# Patient Record
Sex: MALE | Race: BLACK OR AFRICAN AMERICAN | NOT HISPANIC OR LATINO | Employment: FULL TIME | URBAN - METROPOLITAN AREA
[De-identification: names, ages, dates, MRNs, and addresses within clinical notes are randomized per-mention and may not be internally consistent; named-entity substitution may affect disease eponyms.]

---

## 2020-09-26 ENCOUNTER — HOSPITAL ENCOUNTER (EMERGENCY)
Facility: HOSPITAL | Age: 30
Discharge: HOME | End: 2020-09-26
Attending: FAMILY MEDICINE
Payer: COMMERCIAL

## 2020-09-26 VITALS
WEIGHT: 155 LBS | RESPIRATION RATE: 20 BRPM | TEMPERATURE: 98 F | BODY MASS INDEX: 28.52 KG/M2 | HEIGHT: 62 IN | DIASTOLIC BLOOD PRESSURE: 102 MMHG | SYSTOLIC BLOOD PRESSURE: 151 MMHG | OXYGEN SATURATION: 99 % | HEART RATE: 91 BPM

## 2020-09-26 DIAGNOSIS — Z20.822 EXPOSURE TO COVID-19 VIRUS: Primary | ICD-10-CM

## 2020-09-26 PROCEDURE — 99282 EMERGENCY DEPT VISIT SF MDM: CPT

## 2020-09-26 PROCEDURE — U0003 INFECTIOUS AGENT DETECTION BY NUCLEIC ACID (DNA OR RNA); SEVERE ACUTE RESPIRATORY SYNDROME CORONAVIRUS 2 (SARS-COV-2) (CORONAVIRUS DISEASE [COVID-19]), AMPLIFIED PROBE TECHNIQUE, MAKING USE OF HIGH THROUGHPUT TECHNOLOGIES AS DESCRIBED BY CMS-2020-01-R: HCPCS

## 2020-09-26 NOTE — Clinical Note
"Harvey"Lexie Hearn was seen and treated in our emergency department on 9/26/2020.     COVID-19 is present in our communities across the state. There is limited testing for COVID at this time, so not all patients can be tested. In this situation, your employee meets the following criteria:    Harvey Hearn has met the criteria for COVID-19 testing based upon symptoms, travel, and/or potential exposure. The test has been completed and is pending results at this time. During this time the employee is not able to work and should be quarantined per the Centers for Disease Control timelines.     If you have any questions or concerns, or if I can be of further assistance, please do not hesitate to contact me.    Sincerely,             Ilan Feng Jr., HEMAL"

## 2020-09-26 NOTE — ED PROVIDER NOTES
HISTORY     Chief Complaint   Patient presents with    COVID-19 Concerns     pt denies sx. states g/f was seen here last night w/ + covid results.          Review of patient's allergies indicates:   Allergen Reactions    Penicillins Hives and Rash        HPI   The history is provided by the patient.        Pt reports being exposed to Covid-19. Pt reports the following symptoms: none. Pt denies any of the following: CP, SOB, fever, NVD, abdominal pain or any other symptoms at this time.     PCP: Len Nicole MD     Past Medical History:  No past medical history on file.     Past Surgical History:  No past surgical history on file.     Family History:  No family history on file.     Social History:  Social History     Tobacco Use    Smoking status: Not on file   Substance and Sexual Activity    Alcohol use: Not on file    Drug use: Not on file    Sexual activity: Not on file         ROS   Review of Systems   Constitutional: Negative for fever.   HENT: Negative for sore throat.    Respiratory: Negative for shortness of breath.    Cardiovascular: Negative for chest pain.   Gastrointestinal: Negative for nausea.   Genitourinary: Negative for dysuria.   Musculoskeletal: Negative for back pain.   Skin: Negative for rash.   Neurological: Negative for weakness.   Hematological: Does not bruise/bleed easily.   All other systems reviewed and are negative.      PHYSICAL EXAM     Initial Vitals [09/26/20 1715]   BP Pulse Resp Temp SpO2   (!) 151/102 91 20 97.9 °F (36.6 °C) 99 %      MAP       --           Physical Exam     Nursing Notes and Vital Signs Reviewed.  Constitutional: Patient is in no acute distress.   Pulmonary/Chest: No respiratory distress.   Musculoskeletal: Moves all extremities.   Neurological:  Alert, awake, and appropriate.  Normal speech.    Psychiatric: Normal affect. Good eye contact. Appropriate in content.      ED COURSE   Procedures  ED ONGOING VITALS:  Vitals:    09/26/20 1715   BP: (!)  "151/102   Pulse: 91   Resp: 20   Temp: 97.9 °F (36.6 °C)   TempSrc: Oral   SpO2: 99%   Weight: 70.3 kg (155 lb)   Height: 5' 2" (1.575 m)         ABNORMAL LAB VALUES:  Labs Reviewed   SARS-COV-2 (COVID-19) QUALITATIVE PCR         ALL LAB VALUES:        RADIOLOGY STUDIES:  Imaging Results    None                   The above vital signs and test results have been reviewed by the emergency provider.     ED Medications:  There are no discharge medications for this patient.    Discharge Medications:  New Prescriptions    No medications on file           I discussed with patient and/or family/caretaker that evaluation in the ED does not suggest any emergent or life threatening medical conditions requiring immediate intervention beyond what was provided in the ED, and I believe patient is safe for discharge. Regardless, an unremarkable evaluation in the ED does not preclude the development or presence of a serious or life threatening condition. As such, patient was instructed to return immediately for any worsening or change in current symptoms.        MEDICAL DECISION MAKING                 CLINICAL IMPRESSION       ICD-10-CM ICD-9-CM   1. Exposure to Covid-19 Virus  Z20.828                Ilan Feng Jr., [facility]  09/26/20 1721    "

## 2020-09-27 LAB — SARS-COV-2 RNA RESP QL NAA+PROBE: NOT DETECTED

## 2020-10-05 ENCOUNTER — HOSPITAL ENCOUNTER (EMERGENCY)
Facility: HOSPITAL | Age: 30
Discharge: HOME | End: 2020-10-05
Attending: EMERGENCY MEDICINE
Payer: COMMERCIAL

## 2020-10-05 VITALS
OXYGEN SATURATION: 98 % | HEIGHT: 62 IN | SYSTOLIC BLOOD PRESSURE: 154 MMHG | TEMPERATURE: 99 F | WEIGHT: 150 LBS | BODY MASS INDEX: 27.6 KG/M2 | RESPIRATION RATE: 16 BRPM | HEART RATE: 87 BPM | DIASTOLIC BLOOD PRESSURE: 93 MMHG

## 2020-10-05 DIAGNOSIS — Z20.822 CLOSE EXPOSURE TO COVID-19 VIRUS: Primary | ICD-10-CM

## 2020-10-05 PROCEDURE — 99282 EMERGENCY DEPT VISIT SF MDM: CPT

## 2020-10-05 PROCEDURE — U0003 INFECTIOUS AGENT DETECTION BY NUCLEIC ACID (DNA OR RNA); SEVERE ACUTE RESPIRATORY SYNDROME CORONAVIRUS 2 (SARS-COV-2) (CORONAVIRUS DISEASE [COVID-19]), AMPLIFIED PROBE TECHNIQUE, MAKING USE OF HIGH THROUGHPUT TECHNOLOGIES AS DESCRIBED BY CMS-2020-01-R: HCPCS

## 2020-10-05 NOTE — ED PROVIDER NOTES
SCRIBE #1 NOTE: I, Lindsay Boyle, am scribing for, and in the presence of, Shawn Mcginnis MD. I have scribed the entire note.       History     Chief Complaint   Patient presents with    COVID-19 Concerns     Denies symptoms. Seeking covid test after family member tested positive.     Review of patient's allergies indicates:   Allergen Reactions    Penicillins Hives and Rash         History of Present Illness     HPI    10/5/2020, 10:12 AM  History obtained from the patient      History of Present Illness: Harvey Hearn is a 30 y.o. male patient who presents to the Emergency Department for evaluation of COVID-19 concerns which onset a few days ago. Pt reports that his wife recently tested positive for COVID-19 and would like to be tested. Symptoms are constant and moderate in severity. No mitigating or exacerbating factors reported. Associated sxs include generalized body aches. Patient denies any fever, fatigue, n/v, cough, SOB, CP, HA, and all other sxs at this time. No prior Tx reported. No further complaints or concerns at this time.       Arrival mode: Personal transportation      PCP: Len Nicole MD      Past Medical History:  History reviewed. No pertinent past medical history.       Past Surgical History:  History reviewed. No pertinent past surgical history.         Family History:  History reviewed. No pertinent family history.      Social History:   Social History     Tobacco Use    Smoking status: Unknown smoking status   Substance and Sexual Activity    Alcohol use: Unknown alcohol use    Drug use: Unknown drug use    Sexual activity: Unknown        Review of Systems     Review of Systems   Constitutional: Negative for chills, fatigue and fever.   HENT: Negative for sore throat.    Respiratory: Negative for cough and shortness of breath.    Cardiovascular: Negative for chest pain.   Gastrointestinal: Negative for nausea and vomiting.   Genitourinary: Negative for dysuria.  "  Musculoskeletal: Negative for back pain.        (+) generalized body aches   Skin: Negative for rash.   Neurological: Negative for weakness and headaches.   Hematological: Does not bruise/bleed easily.   All other systems reviewed and are negative.       Physical Exam     Initial Vitals [10/05/20 1014]   BP Pulse Resp Temp SpO2   (!) 154/93 87 16 98.8 °F (37.1 °C) 98 %      MAP       --          Physical Exam  Nursing Notes and Vital Signs Reviewed.  Constitutional: Well-developed and well-nourished.   Head: Atraumatic. Normocephalic.  Eyes: EOM intact. No scleral icterus.  ENT: Mucous membranes are moist. Oropharynx is clear and symmetric.    Neck: Supple. Full ROM. No lymphadenopathy.  Cardiovascular: Regular rate. Regular rhythm. No murmurs, rubs, or gallops. Distal pulses are 2+ and symmetric.  Pulmonary/Chest: No respiratory distress. Clear to auscultation bilaterally. No wheezing or rales.  Abdominal: Soft and non-distended.  There is no tenderness.  No rebound, guarding, or rigidity.  Genitourinary: No CVA tenderness  Musculoskeletal: Moves all extremities. No obvious deformities. No calf tenderness.  Skin: Warm and dry.  Neurological:  Alert, awake, and appropriate.  Normal speech.  No acute focal neurological deficits are appreciated.  Psychiatric: Normal affect. Good eye contact. Appropriate in content.     ED Course   Procedures  ED Vital Signs:  Vitals:    10/05/20 1014   BP: (!) 154/93   Pulse: 87   Resp: 16   Temp: 98.8 °F (37.1 °C)   TempSrc: Oral   SpO2: 98%   Weight: 68 kg (150 lb)   Height: 5' 2" (1.575 m)       Abnormal Lab Results:  Labs Reviewed   SARS-COV-2 (COVID-19) QUALITATIVE PCR          The Emergency Provider reviewed the vital signs and test results, which are outlined above.     ED Discussion       10:13 AM: Reassessed pt at this time.  Pt states his condition has improved at this time. Discussed with pt all pertinent ED information and results. Discussed pt dx and plan of tx. Gave pt " all f/u and return to the ED instructions. All questions and concerns were addressed at this time. Pt expresses understanding of information and instructions, and is comfortable with plan to discharge. Pt is stable for discharge.    I discussed with patient and/or family/caretaker that evaluation in the ED does not suggest any emergent or life threatening medical conditions requiring immediate intervention beyond what was provided in the ED, and I believe patient is safe for discharge.  Regardless, an unremarkable evaluation in the ED does not preclude the development or presence of a serious of life threatening condition. As such, patient was instructed to return immediately for any worsening or change in current symptoms.       MDM        Medical Decision Making:   Clinical Tests:   Lab Tests: Ordered and Reviewed       <> Summary of Lab: Pt will be notified of his COVID-19 results.           ED Medication(s):  Medications - No data to display    New Prescriptions    No medications on file       Follow-up Information     Len Nicole MD.    Specialty: Family Medicine  Contact information:  [address] [address]  [phone]                       Scribe Attestation:   Scribe #1: I performed the above scribed service and the documentation accurately describes the services I performed. I attest to the accuracy of the note.     Attending:   Physician Attestation Statement for Scribe #1: I, Shawn Mcginnis MD, personally performed the services described in this documentation, as scribed by Lindsay Boyle, in my presence, and it is both accurate and complete.           Clinical Impression       ICD-10-CM ICD-9-CM   1. Close exposure to COVID-19 virus  Z20.828 V01.79       Disposition:   Disposition: Discharged  Condition: Stable         Shawn Mcginnis MD  10/05/20 1019

## 2020-10-05 NOTE — ED NOTES
"Pt denies symptoms at this time. Reports his spouse was diagnosed with covid and he "wants to make sure I don't have it. Want to get retested."  "

## 2020-10-05 NOTE — Clinical Note
"Harvey"Lexie Hearn was seen and treated in our emergency department on 10/5/2020.     COVID-19 is present in our communities across the state. There is limited testing for COVID at this time, so not all patients can be tested. In this situation, your employee meets the following criteria:    Harvey Hearn has met the criteria for COVID-19 testing based upon symptoms, travel, and/or potential exposure. The test has been completed and is pending results at this time. During this time the employee is not able to work and should be quarantined per the Centers for Disease Control timelines.     If you have any questions or concerns, or if I can be of further assistance, please do not hesitate to contact me.    Sincerely,             Shawn Mcginnis MD"

## 2020-10-06 LAB — SARS-COV-2 RNA RESP QL NAA+PROBE: NOT DETECTED

## 2021-01-20 ENCOUNTER — HOSPITAL ENCOUNTER (EMERGENCY)
Facility: HOSPITAL | Age: 31
Discharge: HOME | End: 2021-01-20
Attending: EMERGENCY MEDICINE
Payer: COMMERCIAL

## 2021-01-20 VITALS
HEART RATE: 86 BPM | DIASTOLIC BLOOD PRESSURE: 95 MMHG | RESPIRATION RATE: 16 BRPM | BODY MASS INDEX: 27.79 KG/M2 | OXYGEN SATURATION: 98 % | WEIGHT: 151 LBS | SYSTOLIC BLOOD PRESSURE: 129 MMHG | HEIGHT: 62 IN | TEMPERATURE: 98 F

## 2021-01-20 DIAGNOSIS — Z20.822 COVID-19 VIRUS NOT DETECTED: Primary | ICD-10-CM

## 2021-01-20 LAB
CTP QC/QA: YES
SARS-COV-2 RDRP RESP QL NAA+PROBE: NEGATIVE

## 2021-01-20 PROCEDURE — U0002 COVID-19 LAB TEST NON-CDC: HCPCS | Performed by: REGISTERED NURSE

## 2021-01-20 PROCEDURE — 99282 EMERGENCY DEPT VISIT SF MDM: CPT

## 2022-03-22 ENCOUNTER — OFFICE VISIT (OUTPATIENT)
Dept: FAMILY MEDICINE | Facility: CLINIC | Age: 32
End: 2022-03-22
Payer: COMMERCIAL

## 2022-03-22 DIAGNOSIS — J06.9 UPPER RESPIRATORY TRACT INFECTION, UNSPECIFIED TYPE: Primary | ICD-10-CM

## 2022-03-22 PROCEDURE — 99213 OFFICE O/P EST LOW 20 MIN: CPT | Mod: 95,,, | Performed by: NURSE PRACTITIONER

## 2022-03-22 PROCEDURE — 99213 PR OFFICE/OUTPT VISIT, EST, LEVL III, 20-29 MIN: ICD-10-PCS | Mod: 95,,, | Performed by: NURSE PRACTITIONER

## 2022-03-22 NOTE — LETTER
March 22, 2022      [facility]  [address]  [facility] [address]  Phone: [phone]  Fax: [phone]       Patient: Harvey Hearn   YOB: 1990  Date of Visit: 03/22/2022    To Whom It May Concern:    Mary Hearn  was at [facility] on 03/22/2022. He may be excused from 3/21/22-3/22/22. The patient may return to work on 3/23/22 with no restrictions. If you have any questions or concerns, or if I can be of further assistance, please do not hesitate to contact me.    Sincerely,    Kandis Barlow MA

## 2022-03-22 NOTE — PROGRESS NOTES
Subjective:       Patient ID: Harvey Hearn is a 32 y.o. male.    Chief Complaint: No chief complaint on file.  The patient location is: [place]   The chief complaint leading to consultation is: nasal congestion    Visit type: audiovisual    Face to Face time with patient: 10  10 minutes of total time spent on the encounter, which includes face to face time and non-face to face time preparing to see the patient (eg, review of tests), Obtaining and/or reviewing separately obtained history, Documenting clinical information in the electronic or other health record, Independently interpreting results (not separately reported) and communicating results to the patient/family/caregiver, or Care coordination (not separately reported).         Each patient to whom he or she provides medical services by telemedicine is:  (1) informed of the relationship between the physician and patient and the respective role of any other health care provider with respect to management of the patient; and (2) notified that he or she may decline to receive medical services by telemedicine and may withdraw from such care at any time.    Notes: Pt reports via tele medicine with chief complaint of congestion, PND and sore throat. Onset 2 days ago.  Took OTC medication with significant improvement in symptoms.  Request work excuse for 2 days of missed work.    HPI  Review of Systems   Constitutional: Negative for activity change and unexpected weight change.   HENT: Positive for rhinorrhea. Negative for hearing loss and trouble swallowing.    Eyes: Negative for discharge and visual disturbance.   Respiratory: Negative for chest tightness and wheezing.    Cardiovascular: Negative for chest pain and palpitations.   Gastrointestinal: Negative for blood in stool, constipation, diarrhea and vomiting.   Endocrine: Negative for polydipsia and polyuria.   Genitourinary: Negative for difficulty urinating, hematuria and urgency.   Musculoskeletal:  Negative for arthralgias, joint swelling and neck pain.   Neurological: Negative for weakness and headaches.   Psychiatric/Behavioral: Negative for confusion and dysphoric mood.       Objective:      Physical Exam  HENT:      Head: Normocephalic.      Right Ear: External ear normal.      Left Ear: External ear normal.   Eyes:      Extraocular Movements: Extraocular movements intact.   Pulmonary:      Effort: Pulmonary effort is normal. No respiratory distress.   Musculoskeletal:      Cervical back: Normal range of motion.   Skin:     General: Skin is warm and dry.   Neurological:      Mental Status: He is alert and oriented to person, place, and time.   Psychiatric:         Behavior: Behavior normal.         Assessment:       1. Upper respiratory tract infection, unspecified type        Plan:   Upper respiratory tract infection, unspecified type  -continue OTC symptomatic treatment.  Work excuse given    No follow-ups on file.